# Patient Record
Sex: FEMALE | Race: WHITE | NOT HISPANIC OR LATINO | Employment: FULL TIME | URBAN - METROPOLITAN AREA
[De-identification: names, ages, dates, MRNs, and addresses within clinical notes are randomized per-mention and may not be internally consistent; named-entity substitution may affect disease eponyms.]

---

## 2020-01-03 ENCOUNTER — INITIAL PRENATAL (OUTPATIENT)
Dept: OBGYN CLINIC | Facility: CLINIC | Age: 25
End: 2020-01-03

## 2020-01-03 VITALS
DIASTOLIC BLOOD PRESSURE: 60 MMHG | HEIGHT: 66 IN | BODY MASS INDEX: 30.57 KG/M2 | SYSTOLIC BLOOD PRESSURE: 116 MMHG | WEIGHT: 190.2 LBS

## 2020-01-03 DIAGNOSIS — Z3A.01 LESS THAN 8 WEEKS GESTATION OF PREGNANCY: ICD-10-CM

## 2020-01-03 DIAGNOSIS — Z34.01 ENCOUNTER FOR SUPERVISION OF NORMAL FIRST PREGNANCY IN FIRST TRIMESTER: Primary | ICD-10-CM

## 2020-01-03 DIAGNOSIS — Z36.9 ANTENATAL SCREENING ENCOUNTER: ICD-10-CM

## 2020-01-03 PROCEDURE — NOBC: Performed by: OBSTETRICS & GYNECOLOGY

## 2020-01-03 NOTE — PROGRESS NOTES
INITIAL PRENATAL NURSE APPT:      LMP 11/10/19 - 7 6/7 wk - [facility] 2020  Unplanned pregnancy - FOB ?  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

## 2020-01-20 LAB
ABO GROUP BLD: NORMAL
APPEARANCE UR: CLEAR
BACTERIA UR QL AUTO: ABNORMAL /HPF
BASOPHILS # BLD AUTO: 31 CELLS/UL (ref 0–200)
BASOPHILS NFR BLD AUTO: 0.3 %
BILIRUB UR QL STRIP: NEGATIVE
BLD GP AB SCN SERPL QL: NORMAL
COLOR UR: YELLOW
EOSINOPHIL # BLD AUTO: 71 CELLS/UL (ref 15–500)
EOSINOPHIL NFR BLD AUTO: 0.7 %
ERYTHROCYTE [DISTWIDTH] IN BLOOD BY AUTOMATED COUNT: 12.6 % (ref 11–15)
GLUCOSE UR QL STRIP: NEGATIVE
HBV SURFACE AG SERPL QL IA: NORMAL
HCT VFR BLD AUTO: 40.2 % (ref 35–45)
HGB BLD-MCNC: 13.6 G/DL (ref 11.7–15.5)
HGB UR QL STRIP: NEGATIVE
HIV 1+2 AB+HIV1 P24 AG SERPL QL IA: NORMAL
HYALINE CASTS #/AREA URNS LPF: ABNORMAL /LPF
KETONES UR QL STRIP: NEGATIVE
LEUKOCYTE ESTERASE UR QL STRIP: ABNORMAL
LYMPHOCYTES # BLD AUTO: 2183 CELLS/UL (ref 850–3900)
LYMPHOCYTES NFR BLD AUTO: 21.4 %
MCH RBC QN AUTO: 30.9 PG (ref 27–33)
MCHC RBC AUTO-ENTMCNC: 33.8 G/DL (ref 32–36)
MCV RBC AUTO: 91.4 FL (ref 80–100)
MONOCYTES # BLD AUTO: 561 CELLS/UL (ref 200–950)
MONOCYTES NFR BLD AUTO: 5.5 %
NEUTROPHILS # BLD AUTO: 7354 CELLS/UL (ref 1500–7800)
NEUTROPHILS NFR BLD AUTO: 72.1 %
NITRITE UR QL STRIP: NEGATIVE
PH UR STRIP: 7 [PH] (ref 5–8)
PLATELET # BLD AUTO: 308 THOUSAND/UL (ref 140–400)
PMV BLD REES-ECKER: 10 FL (ref 7.5–12.5)
PROT UR QL STRIP: NEGATIVE
RBC # BLD AUTO: 4.4 MILLION/UL (ref 3.8–5.1)
RBC #/AREA URNS HPF: ABNORMAL /HPF
RH BLD: NORMAL
RPR SER QL: NORMAL
RUBV IGG SERPL IA-ACNC: 7.9 INDEX
SP GR UR STRIP: 1.02 (ref 1–1.03)
SQUAMOUS #/AREA URNS HPF: ABNORMAL /HPF
WBC # BLD AUTO: 10.2 THOUSAND/UL (ref 3.8–10.8)
WBC #/AREA URNS HPF: ABNORMAL /HPF

## 2020-01-24 ENCOUNTER — ROUTINE PRENATAL (OUTPATIENT)
Dept: OBGYN CLINIC | Facility: CLINIC | Age: 25
End: 2020-01-24

## 2020-01-24 VITALS — SYSTOLIC BLOOD PRESSURE: 122 MMHG | DIASTOLIC BLOOD PRESSURE: 76 MMHG | WEIGHT: 186.8 LBS | BODY MASS INDEX: 30.15 KG/M2

## 2020-01-24 DIAGNOSIS — Z34.01 ENCOUNTER FOR SUPERVISION OF NORMAL FIRST PREGNANCY IN FIRST TRIMESTER: ICD-10-CM

## 2020-01-24 DIAGNOSIS — Z36.9 ANTENATAL SCREENING ENCOUNTER: Primary | ICD-10-CM

## 2020-01-24 PROCEDURE — 87106 FUNGI IDENTIFICATION YEAST: CPT | Performed by: OBSTETRICS & GYNECOLOGY

## 2020-01-24 PROCEDURE — 87070 CULTURE OTHR SPECIMN AEROBIC: CPT | Performed by: OBSTETRICS & GYNECOLOGY

## 2020-01-24 PROCEDURE — 87591 N.GONORRHOEAE DNA AMP PROB: CPT | Performed by: OBSTETRICS & GYNECOLOGY

## 2020-01-24 PROCEDURE — 87491 CHLMYD TRACH DNA AMP PROBE: CPT | Performed by: OBSTETRICS & GYNECOLOGY

## 2020-01-24 PROCEDURE — PNV: Performed by: OBSTETRICS & GYNECOLOGY

## 2020-01-24 PROCEDURE — G0145 SCR C/V CYTO,THINLAYER,RESCR: HCPCS | Performed by: PATHOLOGY

## 2020-01-24 PROCEDURE — G0124 SCREEN C/V THIN LAYER BY MD: HCPCS | Performed by: PATHOLOGY

## 2020-01-24 NOTE — PROGRESS NOTES
Intrauterine pregnancy, , size less than date, EDC changed to August 24, 2020 viable intrauterine pregnancy, single parent, lab work reviewed she is Rh negative  We suggest Maternal-Fetal Medicine consultation  All questions were answered  We talked about weight diet exercise nutrition

## 2020-01-24 NOTE — PATIENT INSTRUCTIONS
Intrauterine pregnancy EDC changed to August 24, 2020, suggest Maternal-Fetal Medicine consultation she is Rh negative  She will be a single parent

## 2020-01-26 LAB
BACTERIA GENITAL AEROBE CULT: ABNORMAL
BACTERIA GENITAL AEROBE CULT: ABNORMAL

## 2020-01-27 LAB
C TRACH DNA SPEC QL NAA+PROBE: NEGATIVE
N GONORRHOEA DNA SPEC QL NAA+PROBE: NEGATIVE

## 2020-01-29 ENCOUNTER — TELEPHONE (OUTPATIENT)
Dept: OBGYN CLINIC | Facility: CLINIC | Age: 25
End: 2020-01-29

## 2020-01-29 NOTE — TELEPHONE ENCOUNTER
----- Message from King Rocha MD sent at 1/29/2020  1:04 PM EST -----  Please inform the patient of positive yeast on culture may treat with Monistat

## 2020-01-30 LAB
LAB AP GYN PRIMARY INTERPRETATION: NORMAL
LAB AP LMP: NORMAL
Lab: NORMAL
PATH INTERP SPEC-IMP: NORMAL

## 2020-02-04 ENCOUNTER — TELEPHONE (OUTPATIENT)
Dept: OBGYN CLINIC | Facility: CLINIC | Age: 25
End: 2020-02-04

## 2020-02-04 NOTE — TELEPHONE ENCOUNTER
----- Message from Alexandria Puga MD sent at 2/2/2020  9:30 AM EST -----  Yeast on pap suggest monistat

## 2020-02-05 PROBLEM — O99.210 OBESITY AFFECTING PREGNANCY: Status: ACTIVE | Noted: 2020-02-05

## 2020-02-06 ENCOUNTER — ROUTINE PRENATAL (OUTPATIENT)
Dept: PERINATAL CARE | Facility: OTHER | Age: 25
End: 2020-02-06
Payer: COMMERCIAL

## 2020-02-06 VITALS
WEIGHT: 190.26 LBS | DIASTOLIC BLOOD PRESSURE: 68 MMHG | HEART RATE: 70 BPM | BODY MASS INDEX: 30.58 KG/M2 | HEIGHT: 66 IN | SYSTOLIC BLOOD PRESSURE: 114 MMHG

## 2020-02-06 DIAGNOSIS — O99.210 OBESITY AFFECTING PREGNANCY, ANTEPARTUM: Primary | ICD-10-CM

## 2020-02-06 DIAGNOSIS — Z36.82 ENCOUNTER FOR (NT) NUCHAL TRANSLUCENCY SCAN: ICD-10-CM

## 2020-02-06 DIAGNOSIS — Z3A.11 11 WEEKS GESTATION OF PREGNANCY: ICD-10-CM

## 2020-02-06 PROBLEM — R03.0 ELEVATED BLOOD PRESSURE READING: Status: ACTIVE | Noted: 2020-02-06

## 2020-02-06 PROBLEM — R03.0 ELEVATED BLOOD PRESSURE READING: Status: RESOLVED | Noted: 2020-02-06 | Resolved: 2020-02-06

## 2020-02-06 PROCEDURE — 76813 OB US NUCHAL MEAS 1 GEST: CPT | Performed by: OBSTETRICS & GYNECOLOGY

## 2020-02-06 PROCEDURE — 99241 PR OFFICE CONSULTATION NEW/ESTAB PATIENT 15 MIN: CPT | Performed by: OBSTETRICS & GYNECOLOGY

## 2020-02-06 RX ORDER — ASPIRIN 81 MG/1
162 TABLET, CHEWABLE ORAL DAILY
Qty: 60 TABLET | Refills: 3 | Status: SHIPPED | OUTPATIENT
Start: 2020-02-06

## 2020-02-06 NOTE — ASSESSMENT & PLAN NOTE
We reviewed the availability of genetic screening, as well as diagnostic testing, which are available to all pregnant women  We reviewed limitations, risks, and benefits of screening and testing  She elected to proceed with Sequential Screen Step 1, and was provided a lab requisition to have it drawn at [facility]  She does not wish to pursue diagnostic testing at this time  A detailed anatomic survey as well as transvaginal cervical length screening are recommended between 18-22 weeks gestation  We discussed the family history of spina bifida and discussed that both the sequential screen and anatomic survey evaluate for spina bifida  The use of low dose aspirin in pregnancy (81-162mg) is recommended in women with a high risk, or multiple moderate risk factors for preeclampsia  Aspirin therapy should be initiated between 12-28 weeks gestation, and is most effective if started prior to 16 weeks gestation, and continued until delivery  Low dose aspirin in pregnancy has been shown to reduce the incidence of preeclampsia in women with risk factors, and has been shown to be safe and without significant maternal or fetal risk  In light of her risk factors which include primiparity and BMI, I recommend initiating aspirin therapy, which was prescribed today

## 2020-02-06 NOTE — LETTER
2020     Rafiq Zepeda MD  [address]  [address] [place] [facility]  [address] [address]    Patient: Jaycee Cook   YOB: 1995   Date of Visit: 2020       Dear Dr Sherlyn Samuel: Thank you for referring Jaycee Cook to me for evaluation  Below are my notes for this consultation  If you have questions, please do not hesitate to call me  I look forward to following your patient along with you  Sincerely,        Gunnar Briseno MD        CC: No Recipients  Gunnar Briseno MD  2020  5:55 PM  Sign at close encounter  CONSULTATION: MATERNAL-FETAL MEDICINE    Dear Rafiq Zepeda, [address] [facility]  [address],    Thank you very much for your kind referral of patient Jaycee Cook for Maternal-Fetal Medicine consultation  As you know, Ms Jasson Morgan is a 30y o  year-old  at [address] presenting for consultation for genetic screening  She has no complaints today  Her Antepartum course is significant for:   Patient Active Problem List   Diagnosis    Obesity affecting pregnancy    11 weeks gestation of pregnancy    Elevated blood pressure reading       Obstetric History:  OB History    Para Term  AB Living   1 0 0 0 0 0   SAB TAB Ectopic Multiple Live Births   0 0 0 0 0      # Outcome Date GA Lbr Casey/2nd Weight Sex Delivery Anes PTL Lv   1 Current                Past Medical History:  Past Medical History:   Diagnosis Date    Urinary tract infection     occas       Past Surgical History:  Past Surgical History:   Procedure Laterality Date    ANTERIOR CRUCIATE LIGAMENT REPAIR      age 10 (R) knee    EYE MUSCLE SURGERY      age 8       Social History:   She denies current use of alcohol, drugs of abuse, tobacco, and marijuana products       Family History:  Family history was reviewed using an office screening tool, and is negative for congenital anomalies, genetic diseases, and thromboembolism in first degree relatives of this pregnancy  Family history is notable for a cousin with spina bifida  Medications:    Current Outpatient Medications:     Prenatal MV-Min-Fe Fum-FA-DHA (PRENATAL+DHA PO), Take 2 tablets by mouth daily, Disp: , Rfl:     aspirin 81 mg chewable tablet, Chew 2 tablets (162 mg total) daily, Disp: 60 tablet, Rfl: 3    Allergies:  No Known Allergies    Review of Systems:  Pertinent items are noted in HPI  Exam:  Vitals: Blood pressure 114/68, pulse 70, height 5' 6" (1 676 m), weight 86 3 kg (190 lb 4 1 oz), last menstrual period 11/10/2019  General appearance: alert, well appearing, and in no distress  The remainder of her physical examination was deferred as she was here today for consultation and discussion  Ultrasound findings today are as follows: There is a single live intrauterine pregnancy with size equivalent to dates  No gross anomalies were identified on limited views  Amniotic fluid is within normal limits  Nuchal translucency measures 1 60mm which is <95th percentile for this crown-rump length  My recommendations are as follows:     11 weeks gestation of pregnancy  We reviewed the availability of genetic screening, as well as diagnostic testing, which are available to all pregnant women  We reviewed limitations, risks, and benefits of screening and testing  She elected to proceed with Sequential Screen Step 1, and was provided a lab requisition to have it drawn at [facility]  She does not wish to pursue diagnostic testing at this time  A detailed anatomic survey as well as transvaginal cervical length screening are recommended between 18-22 weeks gestation  We discussed the family history of spina bifida and discussed that both the sequential screen and anatomic survey evaluate for spina bifida  The use of low dose aspirin in pregnancy (81-162mg) is recommended in women with a high risk, or multiple moderate risk factors for preeclampsia   Aspirin therapy should be initiated between 12-28 weeks gestation, and is most effective if started prior to 16 weeks gestation, and continued until delivery  Low dose aspirin in pregnancy has been shown to reduce the incidence of preeclampsia in women with risk factors, and has been shown to be safe and without significant maternal or fetal risk  In light of her risk factors which include primiparity and BMI, I recommend initiating aspirin therapy, which was prescribed today  Obesity affecting pregnancy  Obesity in pregnancy (defined as body mass index > 30 kg/m2) is associated with an increased risk of several pregnancy complications, including hypertensive disorders, diabetes, abnormal fetal growth, fetal malformations  The risk of  delivery is also increased, as are wound complications in the event of  delivery  A healthy diet and exercise, as well as appropriate gestational weight gain (no more than 11-20 pounds) can help reduce risk of these complications  150 minutes of moderate exercise per week is recommended for all pregnant women  Nutrition counseling is also available if desired  Early screening for gestational diabetes is recommended, as well as routine re-screening at 24-28 weeks if early screening results are normal   fetal surveillance is also recommended as follows:  BMI 30-40:  Evaluation of fetal growth at 32 weeks gestation  BMI >40: Evaluation of fetal growth at 28, 34 weeks gestation, as well as antepartum fetal surveillance once weekly beginning at 36 weeks gestation  Elevated blood pressure reading  Elham Irby first blood pressure was elevated today, repeat measurement was normal  She does not meet criteria for hypertensive disorder at this time  Additional antepartum surveillance may be indicated if a hypertensive diagnosis is made  Evaluation and Management:  The patient was counseled regarding the above findings  The limitations of ultrasound were reviewed    The approximate face-to-face time was 15 minutes  The majority of time (>50%) was spent counseling and/or coordinating care with the patient and/or family members  At the conclusion of today's encounter, all questions were answered to her satisfaction  Thank you very much for this kind referral and please do not hesitate to contact me with any further questions or concerns      Sincerely,    Skyler Castellon MD  Attending Physician, [place]

## 2020-02-06 NOTE — PROGRESS NOTES
CONSULTATION: MATERNAL-FETAL MEDICINE    Dear Elmore Sandhoff, [address] [facility]  [address] [facility], [address] [facility],    Thank you very much for your kind referral of patient Yinka Thayer for Maternal-Fetal Medicine consultation  As you know, Ms Jolene Constantino is a 30y o  year-old  at [address] [facility] presenting for consultation for genetic screening  She has no complaints today  Her Antepartum course is significant for:   Patient Active Problem List   Diagnosis    Obesity affecting pregnancy    11 weeks gestation of pregnancy    Elevated blood pressure reading       Obstetric History:  OB History    Para Term  AB Living   1 0 0 0 0 0   SAB TAB Ectopic Multiple Live Births   0 0 0 0 0      # Outcome Date GA Lbr Casey/2nd Weight Sex Delivery Anes PTL Lv   1 Current                Past Medical History:  Past Medical History:   Diagnosis Date    Urinary tract infection     occas       Past Surgical History:  Past Surgical History:   Procedure Laterality Date    ANTERIOR CRUCIATE LIGAMENT REPAIR      age 10 (R) knee    EYE MUSCLE SURGERY      age 8       Social History:   She denies current use of alcohol, drugs of abuse, tobacco, and marijuana products  Family History:  Family history was reviewed using an office screening tool, and is negative for congenital anomalies, genetic diseases, and thromboembolism in first degree relatives of this pregnancy  Family history is notable for a cousin with spina bifida  Medications:    Current Outpatient Medications:     Prenatal MV-Min-Fe Fum-FA-DHA (PRENATAL+DHA PO), Take 2 tablets by mouth daily, Disp: , Rfl:     aspirin 81 mg chewable tablet, Chew 2 tablets (162 mg total) daily, Disp: 60 tablet, Rfl: 3    Allergies:  No Known Allergies    Review of Systems:  Pertinent items are noted in HPI  Exam:  Vitals: Blood pressure 114/68, pulse 70, height 5' 6" (1 676 m), weight 86 3 kg (190 lb 4 1 oz), last menstrual period 11/10/2019    General appearance: alert, well appearing, and in no distress  The remainder of her physical examination was deferred as she was here today for consultation and discussion  Ultrasound findings today are as follows: There is a single live intrauterine pregnancy with size equivalent to dates  No gross anomalies were identified on limited views  Amniotic fluid is within normal limits  Nuchal translucency measures 1 60mm which is <95th percentile for this crown-rump length  My recommendations are as follows:     11 weeks gestation of pregnancy  We reviewed the availability of genetic screening, as well as diagnostic testing, which are available to all pregnant women  We reviewed limitations, risks, and benefits of screening and testing  She elected to proceed with Sequential Screen Step 1, and was provided a lab requisition to have it drawn at [facility]  She does not wish to pursue diagnostic testing at this time  A detailed anatomic survey as well as transvaginal cervical length screening are recommended between 18-22 weeks gestation  We discussed the family history of spina bifida and discussed that both the sequential screen and anatomic survey evaluate for spina bifida  The use of low dose aspirin in pregnancy (81-162mg) is recommended in women with a high risk, or multiple moderate risk factors for preeclampsia  Aspirin therapy should be initiated between 12-28 weeks gestation, and is most effective if started prior to 16 weeks gestation, and continued until delivery  Low dose aspirin in pregnancy has been shown to reduce the incidence of preeclampsia in women with risk factors, and has been shown to be safe and without significant maternal or fetal risk  In light of her risk factors which include primiparity and BMI, I recommend initiating aspirin therapy, which was prescribed today       Obesity affecting pregnancy  Obesity in pregnancy (defined as body mass index > 30 kg/m2) is associated with an increased risk of several pregnancy complications, including hypertensive disorders, diabetes, abnormal fetal growth, fetal malformations  The risk of  delivery is also increased, as are wound complications in the event of  delivery  A healthy diet and exercise, as well as appropriate gestational weight gain (no more than 11-20 pounds) can help reduce risk of these complications  150 minutes of moderate exercise per week is recommended for all pregnant women  Nutrition counseling is also available if desired  Early screening for gestational diabetes is recommended, as well as routine re-screening at 24-28 weeks if early screening results are normal   fetal surveillance is also recommended as follows:  BMI 30-40:  Evaluation of fetal growth at 32 weeks gestation  BMI >40: Evaluation of fetal growth at 28, 34 weeks gestation, as well as antepartum fetal surveillance once weekly beginning at 36 weeks gestation  Elevated blood pressure reading  Bishop Perera first blood pressure was elevated today, repeat measurement was normal  She does not meet criteria for hypertensive disorder at this time  Additional antepartum surveillance may be indicated if a hypertensive diagnosis is made  Evaluation and Management:  The patient was counseled regarding the above findings  The limitations of ultrasound were reviewed  The approximate face-to-face time was 15 minutes  The majority of time (>50%) was spent counseling and/or coordinating care with the patient and/or family members  At the conclusion of today's encounter, all questions were answered to her satisfaction  Thank you very much for this kind referral and please do not hesitate to contact me with any further questions or concerns      Sincerely,    Grace Mccollum MD  Attending Physician, [place]

## 2020-02-06 NOTE — PATIENT INSTRUCTIONS
The use of low dose aspirin in pregnancy (81-162mg) is recommended in women with a high risk, or multiple moderate risk factors for preeclampsia  Aspirin therapy should be initiated between 12-28 weeks gestation, and is most effective if started prior to 16 weeks gestation, and continued until delivery  Low dose aspirin in pregnancy has been shown to reduce the incidence of preeclampsia in women with risk factors, and has been shown to be safe and without significant maternal or fetal risk  In light of your risk factors for preeclampsia, including: Primiparity (first pregnancy) and Body Mass Index 30 or greater I recommend initiating aspirin therapy, which was prescribed today

## 2020-02-06 NOTE — ASSESSMENT & PLAN NOTE
Elvis Begum first blood pressure was elevated today, repeat measurement was normal  She does not meet criteria for hypertensive disorder at this time  Additional antepartum surveillance may be indicated if a hypertensive diagnosis is made

## 2020-02-14 ENCOUNTER — DOCUMENTATION (OUTPATIENT)
Dept: PERINATAL CARE | Facility: CLINIC | Age: 25
End: 2020-02-14

## 2020-02-14 LAB — MISSING INFO: NORMAL

## 2020-02-14 NOTE — PROGRESS NOTES
I received a call from [address] [place] [address] at [address] regarding Debo's part 1 sequential screen  Per Nikki, the maternal weight is missing from the lab slip  I updated this information and [address] [place] [address] Travon Blair states that the lab will proceed with testing

## 2020-02-15 NOTE — RESULT ENCOUNTER NOTE
Hi  nurses,  Could you call [facility]alaksh[facility] and provide them the information needed to complete the Sequential Screen? If they are unable to process it please let me know  Thanks!   Leda Erickson MD

## 2020-02-18 LAB
# FETUSES US: 1
AGE: NORMAL
B-HCG ADJ MOM SERPL: 1.32
B-HCG SERPL-ACNC: 87.4 IU/ML
COLLECT DATE: NORMAL
CURRENT SMOKER: NORMAL
DONATED EGG PATIENT QL: NO
FET CRL US.MEAS: 58 MM
FET CRL US.MEAS: NORMAL MM
FET NUCHAL FOLD MOM THICKNESS US.MEAS: 1.18
FET NUCHAL FOLD THICKNESS US.MEAS: 1.6 MM
FET NUCHAL FOLD THICKNESS US.MEAS: NORMAL MM
FET TS 21 RISK FROM MAT AGE: NORMAL
GA CLIN EST: 13.1 WK
GA METHOD: NORMAL
HX OF NTD NARR: NO
HX OF TRISOMY 21 NARR: NO
IDDM PATIENT QL: NO
INTEGRATED SCN PATIENT-IMP: NORMAL
PAPP-A MOM SERPL: 1.09
PAPP-A SERPL-MCNC: 876.8 NG/ML
PHYSICIAN NPI: NORMAL
SL AMB NASAL BONE: NORMAL
SL AMB NTQR LOCATION ID#: NORMAL
SL AMB NTQR READING PHYS ID#: NORMAL
SL AMB REFERRING PHYSICIAN NAME: NORMAL
SL AMB REFERRING PHYSICIAN PHONE: NORMAL
SL AMB REPEAT SPECIMEN: NO
SL AMB TWIN B NASAL BONE: NORMAL
SONOGRAPHER NAME: NORMAL
SONOGRAPHER: NORMAL
SONOGRAPHER: NORMAL
TS 18 RISK FETUS: NORMAL
TS 21 RISK FETUS: NORMAL
US DATE: NORMAL
US FETUSES STUDY [IMP]: NORMAL

## 2020-02-19 ENCOUNTER — TELEPHONE (OUTPATIENT)
Dept: PERINATAL CARE | Facility: CLINIC | Age: 25
End: 2020-02-19

## 2020-02-19 NOTE — TELEPHONE ENCOUNTER
----- Message from Mynor Will MD sent at 2/19/2020  5:44 AM EST -----  [facility] RN staff, I've reviewed this Sequential Part 1 result which is normal, can you call her regarding this result? Thank you    Radha Montenegro MD

## 2020-02-19 NOTE — TELEPHONE ENCOUNTER
I attempted to call Debo on the number listed on her communication consent to notify her of the result of her sequential screen part 1  However, there was no answer and there was no option/ability to leave a voicemail  TRF mailed for part 2 sequential screen

## 2020-02-19 NOTE — RESULT ENCOUNTER NOTE
[address] staff, I've reviewed this Sequential Part 1 result which is normal, can you call her regarding this result? Thank you    Ashley Oakley MD

## 2020-02-19 NOTE — LETTER
02/19/20  Jaycee Sheets  1995    Thank you for completing Part 1 of your Sequential Screen  To obtain a complete test result, please complete blood work for Part 2 Sequential Screen between the weeks of 2/26/20 to 3/10/20  However, you do have until 4/21/20 to complete the testing  Based on your insurance coverage, please use one of the following locations  The other option is to go to www "[email or web address]" com  Call our office for any questions at [phone]          Sincerely,    Joy Myrick RN

## 2020-02-21 ENCOUNTER — ROUTINE PRENATAL (OUTPATIENT)
Dept: OBGYN CLINIC | Facility: CLINIC | Age: 25
End: 2020-02-21

## 2020-02-21 VITALS — SYSTOLIC BLOOD PRESSURE: 118 MMHG | DIASTOLIC BLOOD PRESSURE: 66 MMHG | BODY MASS INDEX: 30.67 KG/M2 | WEIGHT: 190 LBS

## 2020-02-21 DIAGNOSIS — Z34.02 ENCOUNTER FOR SUPERVISION OF NORMAL FIRST PREGNANCY IN SECOND TRIMESTER: Primary | ICD-10-CM

## 2020-02-21 PROCEDURE — PNV: Performed by: NURSE PRACTITIONER

## 2020-02-21 NOTE — PROGRESS NOTES
Patient is doing well  Denies LOF/Bleeding/Cramping  First trimester genetic screening US at 6 3/7 weeks  All visualized anatomy for this gestational age is within normal limits  Nuchal translucency is within normal limits (<95%ile for gestational age)  Posterior placenta  Placenta previa  Step One sequential screening normal      RTO in 4 weeks

## 2020-03-17 LAB
# FETUSES US: 1
AFP ADJ MOM SERPL: 0.87
AFP SERPL-MCNC: 28.9 NG/ML
B-HCG ADJ MOM SERPL: 0.93
B-HCG SERPL-ACNC: 21.7 IU/ML
COLLECT DATE: NORMAL
CURRENT SMOKER: NORMAL
FET CRL US.MEAS: 58 MM
FET NUCHAL FOLD MOM THICKNESS US.MEAS: 1.18
FET NUCHAL FOLD THICKNESS US.MEAS: 1.6 MM
FET TS 21 RISK FROM MAT AGE: NORMAL
GA CLIN EST: 17.3 WK
HX OF NTD NARR: NO
IDDM PATIENT QL: NO
INHIBIN A ADJ MOM SERPL: 0.64
INHIBIN A SERPL-MCNC: 94 PG/ML
INTEGRATED SCN PATIENT-IMP: NORMAL
NEURAL TUBE DEFECT RISK FETUS: NORMAL %
PAPP-A MOM SERPL: 1.09
PAPP-A SERPL-MCNC: 876.8 NG/ML
PHYSICIAN NPI: NORMAL
SL AMB NASAL BONE: NORMAL
SL AMB REFERRING PHYSICIAN NAME: NORMAL
SL AMB REFERRING PHYSICIAN PHONE: NORMAL
SL AMB REPEAT SPECIMEN: NO
SL AMB SPECIMEN # FROM PART 1: NORMAL
SL AMB TWIN B NASAL BONE: NORMAL
TS 18 RISK FETUS: NORMAL
TS 21 RISK FETUS: NORMAL
U ESTRIOL ADJ MOM SERPL: 1.05
U ESTRIOL SERPL-MCNC: 1.02 NG/ML
US DATE: NORMAL

## 2020-03-17 NOTE — RESULT ENCOUNTER NOTE
[address] staff, I've reviewed this Sequential Part 2 result which is normal, can you call her regarding this result? Thank you    Chris Mason MD

## 2020-03-18 ENCOUNTER — TELEPHONE (OUTPATIENT)
Dept: PERINATAL CARE | Facility: CLINIC | Age: 25
End: 2020-03-18

## 2020-03-18 NOTE — TELEPHONE ENCOUNTER
I attempted to call Nancy Ledesma on the number listed on her communication consent to notify her of the normal result of her part 2 sequential screen  However, her phone stated that "the wireless customer is not available" and there was no way to leave a voicemail

## 2020-03-18 NOTE — TELEPHONE ENCOUNTER
----- Message from Terry Dolan MD sent at 3/17/2020  3:31 PM EDT -----  [facility] RN staff, I've reviewed this Sequential Part 2 result which is normal, can you call her regarding this result? Thank you    Ezzie Olszewski, MD

## 2020-04-01 ENCOUNTER — TELEPHONE (OUTPATIENT)
Dept: PERINATAL CARE | Facility: CLINIC | Age: 25
End: 2020-04-01

## 2020-04-02 ENCOUNTER — TELEPHONE (OUTPATIENT)
Dept: PERINATAL CARE | Facility: CLINIC | Age: 25
End: 2020-04-02

## 2020-04-07 ENCOUNTER — TELEPHONE (OUTPATIENT)
Dept: PERINATAL CARE | Facility: OTHER | Age: 25
End: 2020-04-07

## 2020-04-08 ENCOUNTER — TELEPHONE (OUTPATIENT)
Dept: PERINATAL CARE | Facility: CLINIC | Age: 25
End: 2020-04-08

## 2020-04-09 ENCOUNTER — ROUTINE PRENATAL (OUTPATIENT)
Dept: OBGYN CLINIC | Facility: CLINIC | Age: 25
End: 2020-04-09

## 2020-04-09 ENCOUNTER — ROUTINE PRENATAL (OUTPATIENT)
Dept: PERINATAL CARE | Facility: CLINIC | Age: 25
End: 2020-04-09
Payer: COMMERCIAL

## 2020-04-09 VITALS
WEIGHT: 199.6 LBS | HEART RATE: 80 BPM | HEIGHT: 66 IN | SYSTOLIC BLOOD PRESSURE: 133 MMHG | BODY MASS INDEX: 32.08 KG/M2 | DIASTOLIC BLOOD PRESSURE: 70 MMHG

## 2020-04-09 VITALS
SYSTOLIC BLOOD PRESSURE: 100 MMHG | HEIGHT: 66 IN | DIASTOLIC BLOOD PRESSURE: 70 MMHG | WEIGHT: 199.2 LBS | BODY MASS INDEX: 32.02 KG/M2

## 2020-04-09 DIAGNOSIS — Z34.02 ENCOUNTER FOR SUPERVISION OF NORMAL FIRST PREGNANCY IN SECOND TRIMESTER: Primary | ICD-10-CM

## 2020-04-09 DIAGNOSIS — Z3A.20 20 WEEKS GESTATION OF PREGNANCY: ICD-10-CM

## 2020-04-09 DIAGNOSIS — O99.212 OBESITY AFFECTING PREGNANCY IN SECOND TRIMESTER: Primary | ICD-10-CM

## 2020-04-09 PROCEDURE — PNV: Performed by: NURSE PRACTITIONER

## 2020-04-09 PROCEDURE — 76805 OB US >/= 14 WKS SNGL FETUS: CPT | Performed by: OBSTETRICS & GYNECOLOGY

## 2020-05-07 ENCOUNTER — TELEMEDICINE (OUTPATIENT)
Dept: OBGYN CLINIC | Facility: CLINIC | Age: 25
End: 2020-05-07

## 2020-05-07 VITALS — SYSTOLIC BLOOD PRESSURE: 116 MMHG | DIASTOLIC BLOOD PRESSURE: 74 MMHG

## 2020-05-07 DIAGNOSIS — Z34.02 ENCOUNTER FOR SUPERVISION OF NORMAL FIRST PREGNANCY IN SECOND TRIMESTER: Primary | ICD-10-CM

## 2020-05-07 DIAGNOSIS — Z36.9 ANTENATAL SCREENING ENCOUNTER: ICD-10-CM

## 2020-05-07 PROCEDURE — PNV: Performed by: NURSE PRACTITIONER

## 2020-05-26 ENCOUNTER — TELEPHONE (OUTPATIENT)
Dept: OBGYN CLINIC | Facility: CLINIC | Age: 25
End: 2020-05-26

## 2020-05-29 ENCOUNTER — TELEPHONE (OUTPATIENT)
Dept: OBGYN CLINIC | Facility: CLINIC | Age: 25
End: 2020-05-29

## 2020-06-25 ENCOUNTER — TELEPHONE (OUTPATIENT)
Dept: PERINATAL CARE | Facility: CLINIC | Age: 25
End: 2020-06-25

## 2020-07-01 ENCOUNTER — TELEPHONE (OUTPATIENT)
Dept: PERINATAL CARE | Facility: CLINIC | Age: 25
End: 2020-07-01

## 2020-07-01 NOTE — TELEPHONE ENCOUNTER
-------------------------------------------------------------    Attempted to reach patient by phone and left voicemail to confirm appointment for MFM ultrasound  1 support person ( must be over the age of 15) may accompany you for your appointment  You must wear a mask (covering nose and mouth) during your visit  You and your support person will be screened upon arrival   IF not feeling well- cough, fever, shortness of breath or any flu like symptoms contact your primary care physician or [address] [place] [address] [facility]  Please call our office prior to entering the building  Check in and rooming questions will be done via phone  Inside office # provided:  Asiya [facility]ivo line: [phone]  [place] line:  [phone]  [facility] line:  [address] line:  [phone]  Jessica [place] line:  [phone]  [place] line:  [phone]    [facility] does not allow cell phone use, recording device or streaming during ultrasound     Any questions with these instructions please call Maternal Fetal Medicine nurse line today @ # [phone]

## 2021-11-10 ENCOUNTER — ANNUAL EXAM (OUTPATIENT)
Dept: OBGYN CLINIC | Facility: CLINIC | Age: 26
End: 2021-11-10
Payer: COMMERCIAL

## 2021-11-10 VITALS
HEIGHT: 66 IN | DIASTOLIC BLOOD PRESSURE: 78 MMHG | SYSTOLIC BLOOD PRESSURE: 112 MMHG | WEIGHT: 220.2 LBS | BODY MASS INDEX: 35.39 KG/M2

## 2021-11-10 DIAGNOSIS — Z11.3 SCREENING EXAMINATION FOR STD (SEXUALLY TRANSMITTED DISEASE): ICD-10-CM

## 2021-11-10 DIAGNOSIS — Z01.419 WOMEN'S ANNUAL ROUTINE GYNECOLOGICAL EXAMINATION: ICD-10-CM

## 2021-11-10 DIAGNOSIS — Z30.011 ENCOUNTER FOR INITIAL PRESCRIPTION OF CONTRACEPTIVE PILLS: Primary | ICD-10-CM

## 2021-11-10 PROCEDURE — 87491 CHLMYD TRACH DNA AMP PROBE: CPT | Performed by: OBSTETRICS & GYNECOLOGY

## 2021-11-10 PROCEDURE — S0612 ANNUAL GYNECOLOGICAL EXAMINA: HCPCS | Performed by: OBSTETRICS & GYNECOLOGY

## 2021-11-10 PROCEDURE — G0145 SCR C/V CYTO,THINLAYER,RESCR: HCPCS | Performed by: OBSTETRICS & GYNECOLOGY

## 2021-11-10 PROCEDURE — 87591 N.GONORRHOEAE DNA AMP PROB: CPT | Performed by: OBSTETRICS & GYNECOLOGY

## 2021-11-10 RX ORDER — NORETHINDRONE ACETATE AND ETHINYL ESTRADIOL 1; .02 MG/1; MG/1
1 TABLET ORAL DAILY
Qty: 84 TABLET | Refills: 3 | Status: SHIPPED | OUTPATIENT
Start: 2021-11-10

## 2021-11-14 LAB
C TRACH DNA SPEC QL NAA+PROBE: NEGATIVE
N GONORRHOEA DNA SPEC QL NAA+PROBE: NEGATIVE

## 2022-02-14 ENCOUNTER — OFFICE VISIT (OUTPATIENT)
Dept: OBGYN CLINIC | Facility: CLINIC | Age: 27
End: 2022-02-14
Payer: COMMERCIAL

## 2022-02-14 VITALS
WEIGHT: 217 LBS | SYSTOLIC BLOOD PRESSURE: 122 MMHG | HEIGHT: 66 IN | DIASTOLIC BLOOD PRESSURE: 80 MMHG | BODY MASS INDEX: 34.87 KG/M2

## 2022-02-14 DIAGNOSIS — N76.0 ACUTE VAGINITIS: ICD-10-CM

## 2022-02-14 DIAGNOSIS — N34.2 INFECTIVE URETHRITIS: Primary | ICD-10-CM

## 2022-02-14 PROCEDURE — 99213 OFFICE O/P EST LOW 20 MIN: CPT | Performed by: OBSTETRICS & GYNECOLOGY

## 2022-02-14 NOTE — PATIENT INSTRUCTIONS
The patient was informed of negative findings  Will make arrangements for urinalysis, will also screen for STD we looking for GC/chlamydia  She will continue to hydrate  Symptoms worsen she will contact me  I will inform results of the studies when they return

## 2022-02-14 NOTE — PROGRESS NOTES
This is a 64-year-old white female, who is now complaining of one-week duration of increased vaginal discharge and occasional burning with urination  She denies any fever chills  Her symptoms are not present today  She last the sexual activity on the 11th of February  Examination  The external genitalia normal limits the vagina is clean there is no odor there is no obvious discharge  A culture was obtained  There is no cervical motion tenderness  The bladder is nontender  There are no acute signs of a UTI  Impression the patient was informed of negative findings  We will await the results of the culture  Will also get a urinalysis for screening  She will be informed results  She will continue to hydrate  She was reminded empty her bladder before and after sex  She will keep me informed her progress    We will screen for STD, looking for GC  Or chlamydia

## 2022-02-17 LAB
A VAGINAE DNA VAG NAA+PROBE-LOG#: 6.1 LOG (CELLS/ML)
C GLABRATA DNA VAG QL NAA+PROBE: NOT DETECTED
C TRACH RRNA SPEC QL NAA+PROBE: NOT DETECTED
CANDIDA DNA VAG QL NAA+PROBE: NOT DETECTED
G VAGINALIS DNA VAG NAA+PROBE-LOG#: 7.5 LOG (CELLS/ML)
LACTOBACILLUS DNA VAG NAA+PROBE-LOG#: NOT DETECTED LOG CELLS/ML
MEGASPHAERA SP DNA VAG NAA+PROBE-LOG#: 6.1 LOG (CELLS/ML)
N GONORRHOEA RRNA SPEC QL NAA+PROBE: NOT DETECTED
SL AMB BV CATEGORY:: ABNORMAL
SL AMB C. PARAPSILOSIS, DNA: NOT DETECTED
SL AMB C. TROPICALIS, DNA: NOT DETECTED
T VAGINALIS RRNA SPEC QL NAA+PROBE: NOT DETECTED

## 2022-02-23 DIAGNOSIS — B96.89 BV (BACTERIAL VAGINOSIS): Primary | ICD-10-CM

## 2022-02-23 DIAGNOSIS — N76.0 BV (BACTERIAL VAGINOSIS): Primary | ICD-10-CM

## 2022-02-23 RX ORDER — METRONIDAZOLE 500 MG/1
TABLET ORAL
Qty: 14 TABLET | Refills: 1 | Status: SHIPPED | OUTPATIENT
Start: 2022-02-23 | End: 2022-03-01

## 2022-02-23 NOTE — PROGRESS NOTES
Patient was informed of bacterial vaginosis on culture now treat with Flagyl 500 mg b i d  for 7 days she will call me if there is no improvement

## 2022-11-15 ENCOUNTER — ANNUAL EXAM (OUTPATIENT)
Dept: OBGYN CLINIC | Facility: CLINIC | Age: 27
End: 2022-11-15

## 2022-11-15 VITALS
DIASTOLIC BLOOD PRESSURE: 82 MMHG | SYSTOLIC BLOOD PRESSURE: 120 MMHG | WEIGHT: 236.6 LBS | BODY MASS INDEX: 38.02 KG/M2 | HEIGHT: 66 IN

## 2022-11-15 DIAGNOSIS — Z11.3 SCREENING EXAMINATION FOR STD (SEXUALLY TRANSMITTED DISEASE): Primary | ICD-10-CM

## 2022-11-15 DIAGNOSIS — Z01.419 WOMEN'S ANNUAL ROUTINE GYNECOLOGICAL EXAMINATION: ICD-10-CM

## 2022-11-15 NOTE — PATIENT INSTRUCTIONS
Patient is requesting STD screening  Also arrange for serology  Her gyn exam is otherwise unremarkable  Pap smear was performed  She is not happy with her weight  She use condoms for contraception in future  She will be informed results of her serology and STD screening return my office in 1 year unless she changes her mind about contraception methods

## 2022-11-15 NOTE — PROGRESS NOTES
Assessment/Plan:   the Patient was informed of a stable gyn examination  She is requesting STD screening  Culture for GC chlamydia on the Pap smear performed  Will also screen for serology  The patient is having some knee problems she is gaining weight can not work out she is try to get that resolved  She is not happy with her weight  She feels safe at home  She has a  for the Larry Ruiz  Subjective:      Patient ID: Josie Ruiz is a 32 y o  female  HPI  This is a 38-year-old white female, she is a  1 para 1 with 1  section approximately 2 years ago for her preeclampsia  Currently she is not in a stable relationship  She is requesting STD screening  She was positive for chlamydia are earlier this summer was treated but did not go for test of cure  Her menstrual cycles are regular predictable  She is no longer using birth control pill  She states she uses condoms most of the time  She was informed of the importance using all the time  She is not happy with her weight she had a torn meniscus in cannot exercise  Denies any problem with depression occasional anxiety but not being treated  She has a dentist on a regular basis  She feels safe at home  There are no new major family illnesses to report  She is not a smoker  The following portions of the patient's history were reviewed and updated as appropriate: allergies, current medications, past family history, past medical history, past social history, past surgical history and problem list     Review of Systems   HENT: Negative for rhinorrhea  All other systems reviewed and are negative  Objective:      /82   Ht 5' 6" (1 676 m)   Wt 107 kg (236 lb 9 6 oz)   LMP 10/14/2022 (Exact Date)   BMI 38 19 kg/m²          Physical Exam  Vitals reviewed  Exam conducted with a chaperone present  Constitutional:       Appearance: Normal appearance     HENT:      Head: Normocephalic and atraumatic  Nose: Nose normal       Mouth/Throat:      Mouth: Mucous membranes are moist    Eyes:      Extraocular Movements: Extraocular movements intact  Conjunctiva/sclera: Conjunctivae normal       Pupils: Pupils are equal, round, and reactive to light  Cardiovascular:      Rate and Rhythm: Normal rate and regular rhythm  Pulses: Normal pulses  Heart sounds: Normal heart sounds  Pulmonary:      Effort: Pulmonary effort is normal       Breath sounds: Normal breath sounds  Chest:   Breasts: Breasts are symmetrical       Right: Normal  No swelling, bleeding, inverted nipple, mass, nipple discharge, skin change, tenderness, axillary adenopathy or supraclavicular adenopathy  Left: Normal  No swelling, bleeding, inverted nipple, mass, nipple discharge, skin change, tenderness, axillary adenopathy or supraclavicular adenopathy  Abdominal:      General: Abdomen is flat  A surgical scar is present  Bowel sounds are normal  There is no distension  Palpations: Abdomen is soft  There is no hepatomegaly, splenomegaly or mass  Tenderness: There is no abdominal tenderness  There is no right CVA tenderness, guarding or rebound  Hernia: No hernia is present  There is no hernia in the umbilical area, ventral area, left inguinal area or right inguinal area  Comments:  section scar well healed   Genitourinary:     General: Normal vulva  Pubic Area: No rash or pubic lice  Labia:         Right: No rash, tenderness, lesion or injury  Left: No rash, tenderness, lesion or injury  Urethra: No prolapse, urethral pain, urethral swelling or urethral lesion  Vagina: Normal  No signs of injury and foreign body  No vaginal discharge, erythema, tenderness, bleeding, lesions or prolapsed vaginal walls  Cervix: Normal       Uterus: Normal  Not deviated, not enlarged, not fixed, not tender and no uterine prolapse         Adnexa: Right adnexa normal and left adnexa normal         Right: No mass, tenderness or fullness  Left: No mass, tenderness or fullness  Rectum: Normal       Comments: The external genitalia normal limits the vagina is clean the cervix is closed uterus is anterior normal size is no cervical motion tenderness  A Pap smear performed will also screen for GC and chlamydia  The adnexa clear there is no evidence of prolapse urethra bladder normal appearance  Musculoskeletal:         General: Normal range of motion  Cervical back: Normal range of motion  Lymphadenopathy:      Upper Body:      Right upper body: No supraclavicular or axillary adenopathy  Left upper body: No supraclavicular or axillary adenopathy  Skin:     General: Skin is warm and dry  Neurological:      General: No focal deficit present  Mental Status: She is alert and oriented to person, place, and time  Psychiatric:         Mood and Affect: Mood normal          Behavior: Behavior normal          Thought Content:  Thought content normal

## 2022-11-18 LAB
C TRACH DNA SPEC QL NAA+PROBE: NEGATIVE
N GONORRHOEA DNA SPEC QL NAA+PROBE: NEGATIVE

## 2022-11-23 LAB
LAB AP GYN PRIMARY INTERPRETATION: NORMAL
LAB AP LMP: NORMAL
Lab: NORMAL

## 2022-12-02 ENCOUNTER — APPOINTMENT (OUTPATIENT)
Dept: LAB | Facility: CLINIC | Age: 27
End: 2022-12-02

## 2022-12-02 DIAGNOSIS — N34.2 INFECTIVE URETHRITIS: ICD-10-CM

## 2022-12-02 DIAGNOSIS — Z11.3 SCREENING EXAMINATION FOR STD (SEXUALLY TRANSMITTED DISEASE): ICD-10-CM

## 2022-12-03 LAB
HAV IGM SER QL: NORMAL
HBV CORE IGM SER QL: NORMAL
HBV SURFACE AG SER QL: NORMAL
HCV AB SER QL: NORMAL

## 2022-12-04 LAB
HIV 1+2 AB+HIV1 P24 AG SERPL QL IA: NORMAL
RPR SER QL: NORMAL

## 2022-12-05 LAB
HSV1 IGG SER IA-ACNC: <0.91 INDEX (ref 0–0.9)
HSV2 IGG SER IA-ACNC: 2.18 INDEX (ref 0–0.9)
HSV2 IGG SERPL QL IA: NEGATIVE